# Patient Record
Sex: FEMALE | Race: WHITE | NOT HISPANIC OR LATINO | URBAN - METROPOLITAN AREA
[De-identification: names, ages, dates, MRNs, and addresses within clinical notes are randomized per-mention and may not be internally consistent; named-entity substitution may affect disease eponyms.]

---

## 2017-02-15 ENCOUNTER — OUTPATIENT (OUTPATIENT)
Dept: OUTPATIENT SERVICES | Facility: HOSPITAL | Age: 67
LOS: 1 days | Discharge: HOME | End: 2017-02-15

## 2017-06-27 DIAGNOSIS — I10 ESSENTIAL (PRIMARY) HYPERTENSION: ICD-10-CM

## 2017-06-27 DIAGNOSIS — E78.5 HYPERLIPIDEMIA, UNSPECIFIED: ICD-10-CM

## 2017-06-27 DIAGNOSIS — D64.9 ANEMIA, UNSPECIFIED: ICD-10-CM

## 2017-06-27 DIAGNOSIS — E55.9 VITAMIN D DEFICIENCY, UNSPECIFIED: ICD-10-CM

## 2017-06-27 DIAGNOSIS — E03.9 HYPOTHYROIDISM, UNSPECIFIED: ICD-10-CM

## 2018-11-14 ENCOUNTER — OUTPATIENT (OUTPATIENT)
Dept: OUTPATIENT SERVICES | Facility: HOSPITAL | Age: 68
LOS: 1 days | Discharge: HOME | End: 2018-11-14

## 2018-11-14 DIAGNOSIS — E78.5 HYPERLIPIDEMIA, UNSPECIFIED: ICD-10-CM

## 2018-11-14 DIAGNOSIS — D64.9 ANEMIA, UNSPECIFIED: ICD-10-CM

## 2018-11-14 DIAGNOSIS — E03.9 HYPOTHYROIDISM, UNSPECIFIED: ICD-10-CM

## 2018-11-14 DIAGNOSIS — E55.9 VITAMIN D DEFICIENCY, UNSPECIFIED: ICD-10-CM

## 2018-11-14 DIAGNOSIS — R73.01 IMPAIRED FASTING GLUCOSE: ICD-10-CM

## 2018-11-14 DIAGNOSIS — I10 ESSENTIAL (PRIMARY) HYPERTENSION: ICD-10-CM

## 2025-06-03 ENCOUNTER — INPATIENT (INPATIENT)
Facility: HOSPITAL | Age: 75
LOS: 1 days | Discharge: HOME | DRG: 69 | End: 2025-06-05
Attending: INTERNAL MEDICINE | Admitting: INTERNAL MEDICINE
Payer: MEDICARE

## 2025-06-03 VITALS
DIASTOLIC BLOOD PRESSURE: 85 MMHG | OXYGEN SATURATION: 97 % | TEMPERATURE: 98 F | SYSTOLIC BLOOD PRESSURE: 143 MMHG | RESPIRATION RATE: 18 BRPM | HEART RATE: 97 BPM | WEIGHT: 114.64 LBS

## 2025-06-03 PROCEDURE — 71045 X-RAY EXAM CHEST 1 VIEW: CPT | Mod: 26

## 2025-06-03 PROCEDURE — 93010 ELECTROCARDIOGRAM REPORT: CPT

## 2025-06-03 PROCEDURE — 99291 CRITICAL CARE FIRST HOUR: CPT | Mod: FS

## 2025-06-03 NOTE — ED ADULT TRIAGE NOTE - CHIEF COMPLAINT QUOTE
from home pt states she is having difficulty driving since yesterday with some vision changes over the last few months.

## 2025-06-04 DIAGNOSIS — I63.9 CEREBRAL INFARCTION, UNSPECIFIED: ICD-10-CM

## 2025-06-04 LAB
ALBUMIN SERPL ELPH-MCNC: 4.4 G/DL — SIGNIFICANT CHANGE UP (ref 3.5–5.2)
ALP SERPL-CCNC: 56 U/L — SIGNIFICANT CHANGE UP (ref 30–115)
ALT FLD-CCNC: 18 U/L — SIGNIFICANT CHANGE UP (ref 0–41)
ANION GAP SERPL CALC-SCNC: 12 MMOL/L — SIGNIFICANT CHANGE UP (ref 7–14)
ANION GAP SERPL CALC-SCNC: 15 MMOL/L — HIGH (ref 7–14)
APPEARANCE UR: CLEAR — SIGNIFICANT CHANGE UP
APTT BLD: 27.6 SEC — SIGNIFICANT CHANGE UP (ref 27–39.2)
AST SERPL-CCNC: 24 U/L — SIGNIFICANT CHANGE UP (ref 0–41)
BACTERIA # UR AUTO: ABNORMAL /HPF
BASOPHILS # BLD AUTO: 0.05 K/UL — SIGNIFICANT CHANGE UP (ref 0–0.2)
BASOPHILS NFR BLD AUTO: 0.8 % — SIGNIFICANT CHANGE UP (ref 0–1)
BILIRUB SERPL-MCNC: 0.7 MG/DL — SIGNIFICANT CHANGE UP (ref 0.2–1.2)
BILIRUB UR-MCNC: NEGATIVE — SIGNIFICANT CHANGE UP
BUN SERPL-MCNC: 19 MG/DL — SIGNIFICANT CHANGE UP (ref 10–20)
BUN SERPL-MCNC: 23 MG/DL — HIGH (ref 10–20)
CALCIUM SERPL-MCNC: 10.2 MG/DL — SIGNIFICANT CHANGE UP (ref 8.4–10.5)
CALCIUM SERPL-MCNC: 8.7 MG/DL — SIGNIFICANT CHANGE UP (ref 8.4–10.5)
CHLORIDE SERPL-SCNC: 101 MMOL/L — SIGNIFICANT CHANGE UP (ref 98–110)
CHLORIDE SERPL-SCNC: 91 MMOL/L — LOW (ref 98–110)
CHOLEST SERPL-MCNC: 232 MG/DL — HIGH
CO2 SERPL-SCNC: 23 MMOL/L — SIGNIFICANT CHANGE UP (ref 17–32)
CO2 SERPL-SCNC: 24 MMOL/L — SIGNIFICANT CHANGE UP (ref 17–32)
COLOR SPEC: YELLOW — SIGNIFICANT CHANGE UP
CREAT SERPL-MCNC: 0.5 MG/DL — LOW (ref 0.7–1.5)
CREAT SERPL-MCNC: 0.5 MG/DL — LOW (ref 0.7–1.5)
DIFF PNL FLD: NEGATIVE — SIGNIFICANT CHANGE UP
EGFR: 99 ML/MIN/1.73M2 — SIGNIFICANT CHANGE UP
EOSINOPHIL # BLD AUTO: 0.18 K/UL — SIGNIFICANT CHANGE UP (ref 0–0.7)
EOSINOPHIL NFR BLD AUTO: 3 % — SIGNIFICANT CHANGE UP (ref 0–8)
EPI CELLS # UR: PRESENT
ETHANOL SERPL-MCNC: <10 MG/DL — SIGNIFICANT CHANGE UP
ETHANOL SERPL-MCNC: <10 MG/DL — SIGNIFICANT CHANGE UP
GLUCOSE BLDC GLUCOMTR-MCNC: 114 MG/DL — HIGH (ref 70–99)
GLUCOSE SERPL-MCNC: 112 MG/DL — HIGH (ref 70–99)
GLUCOSE SERPL-MCNC: 85 MG/DL — SIGNIFICANT CHANGE UP (ref 70–99)
GLUCOSE UR QL: NEGATIVE MG/DL — SIGNIFICANT CHANGE UP
HCT VFR BLD CALC: 41.8 % — SIGNIFICANT CHANGE UP (ref 37–47)
HDLC SERPL-MCNC: 81 MG/DL — SIGNIFICANT CHANGE UP
HGB BLD-MCNC: 14.7 G/DL — SIGNIFICANT CHANGE UP (ref 12–16)
IMM GRANULOCYTES NFR BLD AUTO: 0.2 % — SIGNIFICANT CHANGE UP (ref 0.1–0.3)
INR BLD: 1.01 RATIO — SIGNIFICANT CHANGE UP (ref 0.65–1.3)
KETONES UR QL: NEGATIVE MG/DL — SIGNIFICANT CHANGE UP
LDLC SERPL-MCNC: 137 MG/DL — HIGH
LEUKOCYTE ESTERASE UR-ACNC: ABNORMAL
LIPID PNL WITH DIRECT LDL SERPL: 137 MG/DL — HIGH
LYMPHOCYTES # BLD AUTO: 2.32 K/UL — SIGNIFICANT CHANGE UP (ref 1.2–3.4)
LYMPHOCYTES # BLD AUTO: 38.5 % — SIGNIFICANT CHANGE UP (ref 20.5–51.1)
MAGNESIUM SERPL-MCNC: 1.9 MG/DL — SIGNIFICANT CHANGE UP (ref 1.8–2.4)
MCHC RBC-ENTMCNC: 31.5 PG — HIGH (ref 27–31)
MCHC RBC-ENTMCNC: 35.2 G/DL — SIGNIFICANT CHANGE UP (ref 32–37)
MCV RBC AUTO: 89.7 FL — SIGNIFICANT CHANGE UP (ref 81–99)
MONOCYTES # BLD AUTO: 0.48 K/UL — SIGNIFICANT CHANGE UP (ref 0.1–0.6)
MONOCYTES NFR BLD AUTO: 8 % — SIGNIFICANT CHANGE UP (ref 1.7–9.3)
MRSA PCR RESULT.: NEGATIVE — SIGNIFICANT CHANGE UP
NEUTROPHILS # BLD AUTO: 2.99 K/UL — SIGNIFICANT CHANGE UP (ref 1.4–6.5)
NEUTROPHILS NFR BLD AUTO: 49.5 % — SIGNIFICANT CHANGE UP (ref 42.2–75.2)
NITRITE UR-MCNC: NEGATIVE — SIGNIFICANT CHANGE UP
NONHDLC SERPL-MCNC: 151 MG/DL — HIGH
NRBC BLD AUTO-RTO: 0 /100 WBCS — SIGNIFICANT CHANGE UP (ref 0–0)
OSMOLALITY SERPL: 277 MOS/KG — LOW (ref 280–301)
PH UR: 7 — SIGNIFICANT CHANGE UP (ref 5–8)
PLATELET # BLD AUTO: 277 K/UL — SIGNIFICANT CHANGE UP (ref 130–400)
PMV BLD: SIGNIFICANT CHANGE UP (ref 7.4–10.4)
POTASSIUM SERPL-MCNC: 3.8 MMOL/L — SIGNIFICANT CHANGE UP (ref 3.5–5)
POTASSIUM SERPL-MCNC: 4.7 MMOL/L — SIGNIFICANT CHANGE UP (ref 3.5–5)
POTASSIUM SERPL-SCNC: 3.8 MMOL/L — SIGNIFICANT CHANGE UP (ref 3.5–5)
POTASSIUM SERPL-SCNC: 4.7 MMOL/L — SIGNIFICANT CHANGE UP (ref 3.5–5)
PROT SERPL-MCNC: 6.6 G/DL — SIGNIFICANT CHANGE UP (ref 6–8)
PROT UR-MCNC: NEGATIVE MG/DL — SIGNIFICANT CHANGE UP
PROTHROM AB SERPL-ACNC: 11.9 SEC — SIGNIFICANT CHANGE UP (ref 9.95–12.87)
RBC # BLD: 4.66 M/UL — SIGNIFICANT CHANGE UP (ref 4.2–5.4)
RBC # FLD: 12.9 % — SIGNIFICANT CHANGE UP (ref 11.5–14.5)
RBC CASTS # UR COMP ASSIST: 2 /HPF — SIGNIFICANT CHANGE UP (ref 0–4)
SODIUM SERPL-SCNC: 130 MMOL/L — LOW (ref 135–146)
SODIUM SERPL-SCNC: 136 MMOL/L — SIGNIFICANT CHANGE UP (ref 135–146)
SODIUM UR-SCNC: 28 MMOL/L — SIGNIFICANT CHANGE UP
SP GR SPEC: 1.02 — SIGNIFICANT CHANGE UP (ref 1–1.03)
TRIGL SERPL-MCNC: 82 MG/DL — SIGNIFICANT CHANGE UP
TROPONIN T, HIGH SENSITIVITY RESULT: 24 NG/L — HIGH (ref 6–13)
TROPONIN T, HIGH SENSITIVITY RESULT: 25 NG/L — HIGH (ref 6–13)
UROBILINOGEN FLD QL: 0.2 MG/DL — SIGNIFICANT CHANGE UP (ref 0.2–1)
WBC # BLD: 6.03 K/UL — SIGNIFICANT CHANGE UP (ref 4.8–10.8)
WBC # FLD AUTO: 6.03 K/UL — SIGNIFICANT CHANGE UP (ref 4.8–10.8)
WBC UR QL: 5 /HPF — SIGNIFICANT CHANGE UP (ref 0–5)

## 2025-06-04 PROCEDURE — 83935 ASSAY OF URINE OSMOLALITY: CPT

## 2025-06-04 PROCEDURE — 83036 HEMOGLOBIN GLYCOSYLATED A1C: CPT

## 2025-06-04 PROCEDURE — 94640 AIRWAY INHALATION TREATMENT: CPT

## 2025-06-04 PROCEDURE — 36415 COLL VENOUS BLD VENIPUNCTURE: CPT

## 2025-06-04 PROCEDURE — 85027 COMPLETE CBC AUTOMATED: CPT

## 2025-06-04 PROCEDURE — 84300 ASSAY OF URINE SODIUM: CPT

## 2025-06-04 PROCEDURE — 70450 CT HEAD/BRAIN W/O DYE: CPT | Mod: 26,XU

## 2025-06-04 PROCEDURE — 80307 DRUG TEST PRSMV CHEM ANLYZR: CPT

## 2025-06-04 PROCEDURE — 82962 GLUCOSE BLOOD TEST: CPT

## 2025-06-04 PROCEDURE — 87640 STAPH A DNA AMP PROBE: CPT

## 2025-06-04 PROCEDURE — 70496 CT ANGIOGRAPHY HEAD: CPT | Mod: 26

## 2025-06-04 PROCEDURE — 70551 MRI BRAIN STEM W/O DYE: CPT | Mod: 26

## 2025-06-04 PROCEDURE — 97162 PT EVAL MOD COMPLEX 30 MIN: CPT | Mod: GP

## 2025-06-04 PROCEDURE — 99222 1ST HOSP IP/OBS MODERATE 55: CPT | Mod: FS

## 2025-06-04 PROCEDURE — 93306 TTE W/DOPPLER COMPLETE: CPT | Mod: 26

## 2025-06-04 PROCEDURE — 70551 MRI BRAIN STEM W/O DYE: CPT

## 2025-06-04 PROCEDURE — 97165 OT EVAL LOW COMPLEX 30 MIN: CPT | Mod: GO

## 2025-06-04 PROCEDURE — 80061 LIPID PANEL: CPT

## 2025-06-04 PROCEDURE — 87641 MR-STAPH DNA AMP PROBE: CPT

## 2025-06-04 PROCEDURE — 99222 1ST HOSP IP/OBS MODERATE 55: CPT

## 2025-06-04 PROCEDURE — 92610 EVALUATE SWALLOWING FUNCTION: CPT | Mod: GN

## 2025-06-04 PROCEDURE — 70498 CT ANGIOGRAPHY NECK: CPT | Mod: 26

## 2025-06-04 PROCEDURE — 93306 TTE W/DOPPLER COMPLETE: CPT

## 2025-06-04 PROCEDURE — ZZZZZ: CPT

## 2025-06-04 PROCEDURE — 93010 ELECTROCARDIOGRAM REPORT: CPT

## 2025-06-04 PROCEDURE — 80048 BASIC METABOLIC PNL TOTAL CA: CPT

## 2025-06-04 RX ORDER — LATANOPROST PF 0.05 MG/ML
1 SOLUTION/ DROPS OPHTHALMIC AT BEDTIME
Refills: 0 | Status: DISCONTINUED | OUTPATIENT
Start: 2025-06-04 | End: 2025-06-05

## 2025-06-04 RX ORDER — AMLODIPINE BESYLATE 10 MG/1
5 TABLET ORAL
Refills: 0 | DISCHARGE

## 2025-06-04 RX ORDER — ATORVASTATIN CALCIUM 80 MG/1
80 TABLET, FILM COATED ORAL AT BEDTIME
Refills: 0 | Status: DISCONTINUED | OUTPATIENT
Start: 2025-06-04 | End: 2025-06-05

## 2025-06-04 RX ORDER — ASPIRIN 325 MG
324 TABLET ORAL ONCE
Refills: 0 | Status: COMPLETED | OUTPATIENT
Start: 2025-06-04 | End: 2025-06-04

## 2025-06-04 RX ORDER — ENOXAPARIN SODIUM 100 MG/ML
40 INJECTION SUBCUTANEOUS EVERY 24 HOURS
Refills: 0 | Status: DISCONTINUED | OUTPATIENT
Start: 2025-06-04 | End: 2025-06-05

## 2025-06-04 RX ORDER — AMLODIPINE BESYLATE 10 MG/1
5 TABLET ORAL DAILY
Refills: 0 | Status: DISCONTINUED | OUTPATIENT
Start: 2025-06-04 | End: 2025-06-04

## 2025-06-04 RX ORDER — MELATONIN 5 MG
5 TABLET ORAL AT BEDTIME
Refills: 0 | Status: DISCONTINUED | OUTPATIENT
Start: 2025-06-04 | End: 2025-06-05

## 2025-06-04 RX ORDER — BRIMONIDINE TARTRATE 1.5 MG/ML
1 SOLUTION/ DROPS OPHTHALMIC
Refills: 0 | Status: DISCONTINUED | OUTPATIENT
Start: 2025-06-04 | End: 2025-06-05

## 2025-06-04 RX ORDER — BRIMONIDINE TARTRATE 1.5 MG/ML
1 SOLUTION/ DROPS OPHTHALMIC
Refills: 0 | DISCHARGE

## 2025-06-04 RX ORDER — ASPIRIN 325 MG
81 TABLET ORAL DAILY
Refills: 0 | Status: DISCONTINUED | OUTPATIENT
Start: 2025-06-04 | End: 2025-06-05

## 2025-06-04 RX ORDER — LATANOPROST PF 0.05 MG/ML
1 SOLUTION/ DROPS OPHTHALMIC
Refills: 0 | DISCHARGE

## 2025-06-04 RX ORDER — ACETAMINOPHEN 500 MG/5ML
650 LIQUID (ML) ORAL EVERY 6 HOURS
Refills: 0 | Status: DISCONTINUED | OUTPATIENT
Start: 2025-06-04 | End: 2025-06-05

## 2025-06-04 RX ADMIN — ATORVASTATIN CALCIUM 80 MILLIGRAM(S): 80 TABLET, FILM COATED ORAL at 21:57

## 2025-06-04 RX ADMIN — Medication 81 MILLIGRAM(S): at 11:35

## 2025-06-04 RX ADMIN — ENOXAPARIN SODIUM 40 MILLIGRAM(S): 100 INJECTION SUBCUTANEOUS at 06:50

## 2025-06-04 RX ADMIN — LATANOPROST PF 1 DROP(S): 0.05 SOLUTION/ DROPS OPHTHALMIC at 21:49

## 2025-06-04 RX ADMIN — Medication 324 MILLIGRAM(S): at 03:57

## 2025-06-04 RX ADMIN — Medication 5 MILLIGRAM(S): at 21:52

## 2025-06-04 RX ADMIN — Medication 75 MILLILITER(S): at 06:50

## 2025-06-04 RX ADMIN — Medication 650 MILLIGRAM(S): at 23:00

## 2025-06-04 RX ADMIN — Medication 40 MILLIGRAM(S): at 06:50

## 2025-06-04 RX ADMIN — Medication 1 APPLICATION(S): at 11:35

## 2025-06-04 RX ADMIN — BRIMONIDINE TARTRATE 1 DROP(S): 1.5 SOLUTION/ DROPS OPHTHALMIC at 17:58

## 2025-06-04 RX ADMIN — Medication 1000 MILLILITER(S): at 01:11

## 2025-06-04 RX ADMIN — Medication 650 MILLIGRAM(S): at 22:10

## 2025-06-04 RX ADMIN — Medication 75 MILLILITER(S): at 19:55

## 2025-06-04 NOTE — PHYSICAL THERAPY INITIAL EVALUATION ADULT - ADDITIONAL COMMENTS
As per pt She lives W/ Son in PH W/  5 Steps to enter W/ BL HR and 8 Steps to  her level  W/  LT HR , as per pt she was independent W. all functional activity PTA and was driving

## 2025-06-04 NOTE — PHYSICAL THERAPY INITIAL EVALUATION ADULT - ADL SKILLS, REHAB EVAL
Chief Complaint   Patient presents with    Medicare AWV     Pt here today for AWV    Have you seen any other physician or provider since your last visit no    Have you had any other diagnostic tests since your last visit? no    Have you changed or stopped any medications since your last visit? no independent

## 2025-06-04 NOTE — ED PROVIDER NOTE - PHYSICAL EXAMINATION
CONSTITUTIONAL: Well-appearing; in no apparent distress.   EYES: PERRL; EOM intact.   CARDIOVASCULAR: Normal S1, S2; no murmurs, rubs, or gallops.   RESPIRATORY: Normal chest excursion with respiration; breath sounds clear and equal bilaterally; no wheezes, rhonchi, or rales.  GI/: Normal bowel sounds; non-distended; non-tender; no palpable organomegaly.   MS: No deformity to extremity.  SKIN: No acute rash   NEURO/PSYCH: A & O x 4; CN I-XII grossly intact. no drifting. sensation equal to b/l face and upper/lower extremities. + Word finding difficulty. ?  Slurred.  Normal finger-to-nose.  Ambulates steady.

## 2025-06-04 NOTE — OCCUPATIONAL THERAPY INITIAL EVALUATION ADULT - LIVES WITH, PROFILE
in private house son lives in downstairs apartment; 0 steps to enter via the garage then 5 steps + 8 steps with BHR to main living area; (+) Bathtub (+) Standard toilet/children

## 2025-06-04 NOTE — SWALLOW BEDSIDE ASSESSMENT ADULT - SLP GENERAL OBSERVATIONS
Pt. received in bed awake and alert. Oriented to person, place, and time. Reported she came to the hospital 2/2 to "eye problems". Suspected cognitive deficits present.

## 2025-06-04 NOTE — H&P ADULT - NS ATTEND AMEND GEN_ALL_CORE FT
elderly W female c/o feeling off. pt appears confused . adm to r/o acute stroke vs dementia alzheimer's type   PMH;  HTN, glaucoma left eye  ICU stepdown  neurology consult  MRI brain  PT/OT  [id number]  lipitor 80mg  pt's appears confuse, Na-130 w/u for hyponatremia

## 2025-06-04 NOTE — H&P ADULT - ASSESSMENT
elderly W female c/o feeling off. pt appears confused . adm to r/o acute stroke vs dementia alzheimer's type   PMH;  HTN, glaucoma left eye elderly W female c/o feeling off. pt appears confused . adm to r/o acute stroke vs dementia alzheimer's type   PMH;  HTN, glaucoma left eye  ICU stepdown  neurology consult  MRI brain  PT/OT  [id number]  lipitor 80mg  pt's appears confuse, Na-130 w/u for hyponatremia

## 2025-06-04 NOTE — H&P ADULT - PROBLEM SELECTOR PLAN 1
ICU stepdown  neurology consult  MRI brain  PT/OT  [id number]  lipitor 80mg  pt's appears confuse, Na-130 w/u for hyponatremia

## 2025-06-04 NOTE — ED PROVIDER NOTE - CLINICAL SUMMARY MEDICAL DECISION MAKING FREE TEXT BOX
74 years old female history of hypertension, left eye glaucoma (blind for 2 to 3 months)  pw  stroke symptoms  started 24-48 hours ago . son noticed intermittent slurred speech since yesterday -   Pt  asked ot  come to hospital as she was concerned she having a stroke has  been feeling "off", dizzy. no fall or trauma  no dysphagia , . in ED  Alert and oriented.  CN 2-12 intact.  Motor strength and sensory response is symmetric.  CB intact. normal gait,  occasional word findings difficulty - when asked son and patient at bedside if this is new, patient  says no,  son is not sure.  no stroke code called due to  unknown  onset. 74 years old female history of hypertension, left eye glaucoma (blind for 2 to 3 months)  pw  stroke symptoms  started 24-48 hours ago . son noticed intermittent slurred speech since yesterday -   Pt  asked ot  come to hospital as she was concerned she having a stroke has  been feeling "off", dizzy. no fall or trauma  no dysphagia , . in ED  Alert and oriented.  CN 2-12 intact.  Motor strength and sensory response is symmetric.  CB intact. normal gait,  occasional word findings difficulty - when asked son and patient at bedside if this is new, patient  says no,  son is not sure.  no stroke code called due to  unknown  onset not tpa candidate,   CT CTA  no acute pathology    dw neuro - plan for admission for q4 hour neuro checks   asa givenin ED

## 2025-06-04 NOTE — PHYSICAL THERAPY INITIAL EVALUATION ADULT - GENERAL OBSERVATIONS, REHAB EVAL
9:05 - 9:30 Chart reviewed. Order received.  Patient is ok to be  seen for PT,confirmed with RN. pt encountered  Semi costa in bed   W/ OT Zakiya denies pain, and agrees to participate in session, +    RUE IV / Tele / Pulse ox /  BP Cuff ,NAD.

## 2025-06-04 NOTE — ED PROVIDER NOTE - OBJECTIVE STATEMENT
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  Third time of onset of symptoms unclear.  Patient reports she started feeling off, dizzy, trouble speaking, not able to drive since yesterday.  Continue with above symptoms and feeling she is having a stroke so she called her son who brought patient to ED for evaluation.  Patient otherwise denies headache, extremity numbness and weakness, facial droop, chest pain, abdominal pain, nausea, vomiting, diarrhea or urinary symptoms.

## 2025-06-04 NOTE — ED PROVIDER NOTE - PROGRESS NOTE DETAILS
discussed with neuro -  recommends q4h  neurochecks Pt explains she does not want to follow with Dr nichols any more,  Kael admit now to hospitalist

## 2025-06-04 NOTE — OCCUPATIONAL THERAPY INITIAL EVALUATION ADULT - GENERAL OBSERVATIONS, REHAB EVAL
09:00-09:25 Chart reviewed, ok to treat by Occupational Therapist as confirmed by RN Marcia, Pt received semi-Sung's in bed (+) IV (disconnected by RN) (+) tele (+) BP Cuff (+) Pulse O2 in NAD. Pt in agreement with OT IE.

## 2025-06-04 NOTE — CONSULT NOTE ADULT - ASSESSMENT
IMPRESSION:    - AMS   - Hyponatremia-mild   - H/O HTN   - H/O HLD        PLAN:      CNS: avoid sedation, CT head noted, f/u MRI brain, neuro reccs     HEENT: Oral care    PULMONARY:  HOB @ 45 degrees.  Aspiration precautions, wean oxygen, CXR, RVP     CARDIOVASCULAR: avoid volume overload, MAP adequate, troponin noted, f/u echo      GI: GI prophylaxis.  Feeding.      RENAL:  Follow up lytes.  Correct as needed, trend Na, f/u U Osm and Anni, serum Osm     INFECTIOUS DISEASE: Follow up cultures, procalcitonin, RVP     HEMATOLOGICAL:  DVT prophylaxis. DIMER    ENDOCRINE:  Follow up FS.  Insulin protocol if needed. f.u TSH     MUSCULOSKELETAL: ambulate as tolerated    Can DG to tele if neuro okay with less frequent neuro-checks

## 2025-06-04 NOTE — PATIENT PROFILE ADULT - ARRIVAL FROM
12.4   20.77 )-----------( 461      ( 2017 15:45 )             39.9           152<H>  |  107  |  36<H>  ----------------------------<  131<H>  3.0<L>   |  33<H>  |  0.75    Ca    9.4      2017 15:45  Mg     2.1         TPro  7.5  /  Alb  3.2<L>  /  TBili  0.4  /  DBili  x   /  AST  89<H>  /  ALT  61<H>  /  AlkPhos  169<H>          Urinalysis Basic - ( 2017 15:45 )    Color: YELLOW / Appearance: HAZY / S.016 / pH: 6.5  Gluc: NEGATIVE / Ketone: NEGATIVE  / Bili: NEGATIVE / Urobili: NORMAL E.U.   Blood: SMALL / Protein: 30 / Nitrite: NEGATIVE   Leuk Esterase: LARGE / RBC: 10-25 / WBC >50   Sq Epi: OCC / Non Sq Epi: x / Bacteria: MOD    CARDIAC MARKERS ( 2017 15:45 )  x     / < 0.06 ng/mL / 173 u/L / 2.09 ng/mL / x        T(C): 36.8 (17 @ 16:18), Max: 39.7 (17 @ 16:12)  HR: 93 (17 @ 16:18) (93 - 99)  BP: 91/46 (17 @ 16:18) (91/46 - 97/59)  RR: 22 (17 @ 16:18) (20 - 22)  SpO2: 97% (17 @ 16:18) (79% - 97%) Home Universal Safety Interventions

## 2025-06-04 NOTE — OCCUPATIONAL THERAPY INITIAL EVALUATION ADULT - PERTINENT HX OF CURRENT PROBLEM, REHAB EVAL
76yo W female , PMH as noted below. Pt w/ various complaints : stated to ER MD she feels: she's having a stroke. saying she can't drive, and she not eating. Also pt changes her story initially c/o dizziness , but when I ask her about feeling dizzy she denies.  The  Pt's son is not much of help , he is unaware that mother having confused state  Pt denies-tobacco, alcohol, drug abuse, cardiac arrhthymias , Supposedly this was happening over the last 2 days,  No stroke code was called in ER and CT imaging was all negative for acute pathology, The labs however did show some mild hyponatremia ,130. will send w/u for hyponatremia , which may explain pt confused state vs early dementia.    ER MD contacted neurology Dr Fried who recommended  neuro checks q 4h. and stroke w/u. Pt for adm to ICU stepdown for r/o stroke

## 2025-06-04 NOTE — PATIENT PROFILE ADULT - FALL HARM RISK - RISK INTERVENTIONS
Assistance OOB with selected safe patient handling equipment/Assistance with ambulation/Communicate Fall Risk and Risk Factors to all staff, patient, and family/Discuss with provider need for PT consult/Monitor gait and stability/Provide patient with walking aids - walker, cane, crutches/Reinforce activity limits and safety measures with patient and family/Sit up slowly, dangle for a short time, stand at bedside before walking/Visual Cue: Yellow wristband/Bed in lowest position, wheels locked, appropriate side rails in place/Call bell, personal items and telephone in reach/Instruct patient to call for assistance before getting out of bed or chair/Non-slip footwear when patient is out of bed/[place] to call system/Physically safe environment - no spills, clutter or unnecessary equipment/Purposeful Proactive Rounding/Room/bathroom lighting operational, light cord in reach

## 2025-06-04 NOTE — CONSULT NOTE ADULT - SUBJECTIVE AND OBJECTIVE BOX
Patient is a 74y old  Female who presents with a chief complaint of r/o stroke (2025 07:36)      HPI:  74yo W female , PMH as noted below. Pt w/ various complaints : stated to ER MD she feels: she's having a stroke. saying she can't drive, and she not eating. Also pt changes her story initially c/o dizziness , but when I ask her about feeling dizzy she denies.  The  Pt's son is not much of help , he is unaware that mother having confused state  Pt denies-tobacco, alcohol, drug abuse, cardiac arrhthymias , Supposedly this was happening over the last 2 days,  No stroke code was called in ER and CT imaging was all negative for acute pathology, The labs however did show some mild hyponatremia ,130. will send w/u for hyponatremia , which may explain pt confused state vs early dementia.    ER MD contacted neurology Dr Fried who recommended  neuro checks q 4h. and stroke w/u. Pt for adm to ICU stepdown for r/o stroke (2025 05:22)      PAST MEDICAL & SURGICAL HISTORY:  HTN (hypertension)      Glaucoma      FAMILY HISTORY:  :  No known cardiovacular family hisotry     Review Of Systems:     All ROS are negative except per HPI       Allergies    No Known Allergies    Intolerances          PHYSICAL EXAM    ICU Vital Signs Last 24 Hrs  T(C): 36 (2025 05:45), Max: 36.8 (2025 23:38)  T(F): 96.8 (2025 05:45), Max: 98.2 (2025 23:38)  HR: 91 (2025 05:45) (82 - 97)  BP: 138/67 (2025 05:45) (138/67 - 159/79)  BP(mean): 97 (2025 05:45) (97 - 97)  ABP: --  ABP(mean): --  RR: 24 (2025 05:45) (18 - 24)  SpO2: 93% (2025 05:45) (93% - 97%)    O2 Parameters below as of 2025 05:45  Patient On (Oxygen Delivery Method): room air            CONSTITUTIONAL:  Well nourished.   NAD    ENT:   Airway patent,   Mouth with normal mucosa.       CARDIAC:   Normal rate,   Regular rhythm.    No edema      RESPIRATORY:   No wheezing  Bilateral BS   Not tachypneic,  No use of accessory muscles    GASTROINTESTINAL:  Abdomen soft,   Non-tender,   No guarding,   + BS    NEUROLOGICAL:   Alert and oriented   No motor deficits.    SKIN:   Skin normal color for race,   No evidence of rash.        25 @ 07:01  -  25 @ 07:00  --------------------------------------------------------  IN:    sodium chloride 0.9%: 75 mL  Total IN: 75 mL    OUT:  Total OUT: 0 mL    Total NET: 75 mL          LABS:                          14.7   6.03  )-----------( 277      ( 2025 00:02 )             41.8                                               06-    130[L]  |  91[L]  |  19  ----------------------------<  112[H]  3.8   |  24  |  0.5[L]    Ca    10.2      2025 00:02  Mg     1.9     06-    TPro  6.6  /  Alb  4.4  /  TBili  0.7  /  DBili  x   /  AST  24  /  ALT  18  /  AlkPhos  56  06-04      PT/INR - ( 2025 00:02 )   PT: 11.90 sec;   INR: 1.01 ratio         PTT - ( 2025 00:02 )  PTT:27.6 sec                                       Urinalysis Basic - ( 2025 02:48 )    Color: Yellow / Appearance: Clear / S.025 / pH: x  Gluc: x / Ketone: x  / Bili: Negative / Urobili: 0.2 mg/dL   Blood: x / Protein: Negative mg/dL / Nitrite: Negative   Leuk Esterase: Small / RBC: 2 /HPF / WBC 5 /HPF   Sq Epi: x / Non Sq Epi: x / Bacteria: Few /HPF                                                  LIVER FUNCTIONS - ( 2025 00:02 )  Alb: 4.4 g/dL / Pro: 6.6 g/dL / ALK PHOS: 56 U/L / ALT: 18 U/L / AST: 24 U/L / GGT: x                                                  Urinalysis with Rflx Culture (collected 2025 02:48)                                                                                           X-Rays reviewed                                                                                     ECHO      MEDICATIONS  (STANDING):  amLODIPine   Tablet 5 milliGRAM(s) Oral daily  aspirin enteric coated 81 milliGRAM(s) Oral daily  atorvastatin 80 milliGRAM(s) Oral at bedtime  brimonidine 0.2% Ophthalmic Solution 1 Drop(s) Both EYES two times a day  chlorhexidine 2% Cloths 1 Application(s) Topical <User Schedule>  enoxaparin Injectable 40 milliGRAM(s) SubCutaneous every 24 hours  latanoprost 0.005% Ophthalmic Solution 1 Drop(s) Both EYES at bedtime  pantoprazole    Tablet 40 milliGRAM(s) Oral before breakfast  sodium chloride 0.9%. 1000 milliLiter(s) (75 mL/Hr) IV Continuous <Continuous>    MEDICATIONS  (PRN):

## 2025-06-04 NOTE — PATIENT PROFILE ADULT - FALL HARM RISK - FACTORS NURSING JUDGEMENT
Mother to schedule visit for infant at Dr Otoole's office in 2-3 days.     No [facility]

## 2025-06-04 NOTE — PHYSICAL THERAPY INITIAL EVALUATION ADULT - PERTINENT HX OF CURRENT PROBLEM, REHAB EVAL
74yo W female , PMH as noted below. Pt w/ various complaints : stated to ER MD she feels: she's having a stroke. saying she can't drive, and she not eating. Also pt changes her story initially c/o dizziness , but when I ask her about feeling dizzy she denies.  The  Pt's son is not much of help , he is unaware that mother having confused state  Pt denies-tobacco, alcohol, drug abuse, cardiac arrhthymias , Supposedly this was happening over the last 2 days,  No stroke code was called in ER and CT imaging was all negative for acute pathology, The labs however did show some mild hyponatremia ,130. will send w/u for hyponatremia , which may explain pt confused state vs early dementia.    ER MD contacted neurology Dr Fried who recommended  neuro checks q 4h. and stroke w/u. Pt for adm to ICU stepdown for r/o stroke  fall precautions

## 2025-06-04 NOTE — ED ADULT NURSE NOTE - NSFALLUNIVINTERV_ED_ALL_ED
Bed/Stretcher in lowest position, wheels locked, appropriate side rails in place/Call bell, personal items and telephone in reach/Instruct patient to call for assistance before getting out of bed/chair/stretcher/Non-slip footwear applied when patient is off stretcher/[place] to call system/Physically safe environment - no spills, clutter or unnecessary equipment/Purposeful proactive rounding/Room/bathroom lighting operational, light cord in reach

## 2025-06-04 NOTE — H&P ADULT - HISTORY OF PRESENT ILLNESS
74yo W female , PMH as noted below. Pt w/ various complaints : stated to ER MD she feels: she's having a stroke. saying she can't drive, and she not eating. Also pt changes her story initially c/o dizziness , but when I ask her about feeling dizzy she denies.  The  Pt's son is not much of help , he is unaware that mother having confused state  Pt denies-tobacco, alcohol, drug abuse, cardiac arrhthymias , Supposedly this was happening over the last 2 days,  No stroke code was called in ER and CT imaging was all negative for acute pathology, The labs however did show some mild hyponatremia ,130. will send w/u for hyponatremia , which may explain pt confused state vs early dementia.    ER MD contacted neurology Dr Fried who recommended  neuro checks q 4h. and stroke w/u. Pt for adm to ICU stepdown for r/o stroke

## 2025-06-04 NOTE — CONSULT NOTE ADULT - SUBJECTIVE AND OBJECTIVE BOX
HPI:  74 yo W female , PMH as noted below. Pt w/ various complaints : stated to ER MD she feels: she's having a stroke. saying she can't drive, and she not eating. Also pt changes her story initially c/o dizziness , but when I ask her about feeling dizzy she denies.  The  Pt's son is not much of help , he is unaware that mother having confused state  Pt denies-tobacco, alcohol, drug abuse, cardiac arrhthymias , Supposedly this was happening over the last 2 days,  No stroke code was called in ER and CT imaging was all negative for acute pathology, The labs however did show some mild hyponatremia ,130. will send w/u for hyponatremia , which may explain pt confused state vs early dementia.    ER MD contacted neurology Dr Fried who recommended  neuro checks q 4h. and stroke w/u. Pt for adm to ICU stepdown for r/o stroke , ptn seen and exam  at  bed  side  no new c/o  aox3  doing  well  ptn  labs  , imaging and  medical  notes are  appreciated  and  reviewed  .    PTN  REFERRED TO ACUTE  REHAB  FOR  EVAL AND  TX   PAST MEDICAL & SURGICAL HISTORY:  HTN (hypertension)      Glaucoma          Hospital Course:    TODAY'S SUBJECTIVE & REVIEW OF SYMPTOMS:     Constitutional WNL   Cardio WNL   Resp WNL   GI WNL  Heme WNL  Endo WNL  Skin WNL  MSK WNL  Neuro WNL  Cognitive WNL  Psych WNL      MEDICATIONS  (STANDING):  aspirin enteric coated 81 milliGRAM(s) Oral daily  atorvastatin 80 milliGRAM(s) Oral at bedtime  brimonidine 0.2% Ophthalmic Solution 1 Drop(s) Both EYES two times a day  chlorhexidine 2% Cloths 1 Application(s) Topical <User Schedule>  enoxaparin Injectable 40 milliGRAM(s) SubCutaneous every 24 hours  latanoprost 0.005% Ophthalmic Solution 1 Drop(s) Both EYES at bedtime  pantoprazole    Tablet 40 milliGRAM(s) Oral before breakfast  sodium chloride 0.9%. 1000 milliLiter(s) (75 mL/Hr) IV Continuous <Continuous>    MEDICATIONS  (PRN):      FAMILY HISTORY:      Allergies    No Known Allergies    Intolerances        SOCIAL HISTORY:    [  ] Etoh  [  ] Smoking  [  ] Substance abuse     Home Environment:  [ x ] Home Alone  [  ] Lives with Family son  up stairs   [  ] Home Health Aid    Dwelling:  [  ] Apartment  [ x ] Private House  [  ] Adult Home  [  ] Skilled Nursing Facility      [  ] Short Term  [  ] Long Term  [  x] Stairs       Elevator [  ]    FUNCTIONAL STATUS PTA: (Check all that apply)  Ambulation: [ x  ]Independent    [  ] Dependent     [  ] Non-Ambulatory  Assistive Device: [  ] SA Cane  [  ]  Q Cane  [  ] Walker  [  ]  Wheelchair  ADL : [ x ] Independent  [  ]  Dependent       Vital Signs Last 24 Hrs  T(C): 36.8 (2025 08:10), Max: 36.8 (2025 23:38)  T(F): 98.2 (2025 08:10), Max: 98.2 (2025 23:38)  HR: 78 (2025 08:10) (78 - 97)  BP: 124/69 (2025 08:10) (124/69 - 159/79)  BP(mean): 97 (2025 05:45) (97 - 97)  RR: 20 (2025 08:10) (18 - 24)  SpO2: 93% (2025 08:10) (93% - 97%)    Parameters below as of 2025 05:45  Patient On (Oxygen Delivery Method): room air          PHYSICAL EXAM: Alert & Oriented X3  GENERAL: NAD, well-groomed, well-developed  HEAD:  Atraumatic, Normocephalic  EYES: EOMI, PERRLA, conjunctiva and sclera clear  NECK: Supple, No JVD, Normal thyroid  CHEST/LUNG: Clear to percussion bilaterally; No rales, rhonchi, wheezing, or rubs  HEART: Regular rate and rhythm; No murmurs, rubs, or gallops  ABDOMEN: Soft, Nontender, Nondistended; Bowel sounds present  EXTREMITIES:  2+ Peripheral Pulses, No clubbing, cyanosis, or edema    NERVOUS SYSTEM:  Cranial Nerves 2-12 intact [x  ] Abnormal  [  ]  ROM: WFL all extremities [  x]  Abnormal [  ]  Motor Strength: WFL all extremities  [x  ]  Abnormal [  ]  Sensation: intact to light touch [ x ] Abnormal [  ]  Reflexes: Symmetric [ x ]  Abnormal [  ]    FUNCTIONAL STATUS:  Bed Mobility: Independent [  ]  Supervision [  ]  Needs Assistance [  ]  N/A [x  ]  Transfers: Independent [  ]  Supervision [  ]  Needs Assistance [  ]  N/A [ x ]   Ambulation: Independent [  ]  Supervision [  ]  Needs Assistance [  ]  N/A [x  ]  ADL: Independent [  ] Requires Assistance [  ] N/A [  x]  SEE PT/OT IE NOTES    LABS:                        14.7   6.03  )-----------( 277      ( 2025 00:02 )             41.8     06-04    130[L]  |  91[L]  |  19  ----------------------------<  112[H]  3.8   |  24  |  0.5[L]    Ca    10.2      2025 00:02  Mg     1.9     06-04    TPro  6.6  /  Alb  4.4  /  TBili  0.7  /  DBili  x   /  AST  24  /  ALT  18  /  AlkPhos  56  06-04    PT/INR - ( 2025 00:02 )   PT: 11.90 sec;   INR: 1.01 ratio         PTT - ( 2025 00:02 )  PTT:27.6 sec  Urinalysis Basic - ( 2025 02:48 )    Color: Yellow / Appearance: Clear / S.025 / pH: x  Gluc: x / Ketone: x  / Bili: Negative / Urobili: 0.2 mg/dL   Blood: x / Protein: Negative mg/dL / Nitrite: Negative   Leuk Esterase: Small / RBC: 2 /HPF / WBC 5 /HPF   Sq Epi: x / Non Sq Epi: x / Bacteria: Few /HPF        RADIOLOGY & ADDITIONAL STUDIES:< from: CT Head No Cont (25 @ 00:30) >    IMPRESSION:  No evidence of acute intracranial pathology      < end of copied text >      Assesment:

## 2025-06-04 NOTE — CONSULT NOTE ADULT - ASSESSMENT
IMPRESSION: Rehab of 74 y/o  f rehab  for r/o  cva  tia      PRECAUTIONS: [  ] Cardiac  [  ] Respiratory  [  ] Seizures [  ] Contact Isolation  [  ] Droplet Isolation  [ FALL ] Other    Weight Bearing Status:     RECOMMENDATION:    Out of Bed to Chair     DVT/Decubiti Prophylaxis    REHAB PLAN:     [ x  ] Bedside P/T 3-5 times a week   [  x ]   Bedside O/T  2-3 times a week             [   ] No Rehab Therapy Indicated                   [   ]  Speech Therapy   Conditioning/ROM                                    ADL  Bed Mobility                                               Conditioning/ROM  Transfers                                                     Bed Mobility  Sitting /Standing Balance                         Transfers                                        Gait Training                                               Sitting/Standing Balance  Stair Training [   ]Applicable                    Home equipment Eval                                                                        Splinting  [   ] Only      GOALS:   ADL   [  x ]   Independent                    Transfers  [ x  ] Independent                          Ambulation  [  x ] Independent     [    ] With device                            [   ]  CG                                                         [   ]  CG                                                                  [   ] CG                            [    ] Min A                                                   [   ] Min A                                                              [   ] Min  A          DISCHARGE PLAN:   [   ]  Good candidate for Intensive Rehabilitation/Hospital based-4A [facility]                                             Will tolerate 3hrs Intensive Rehab Daily                                       [    ]  Short Term Rehab in Skilled Nursing Facility                                       [  xx  ]  Home with Outpatient or VN services d/w  ptn rehab  plan  ptn agree                                           [    ]  Possible Candidate for Intensive Hospital based Rehab

## 2025-06-04 NOTE — CONSULT NOTE ADULT - SUBJECTIVE AND OBJECTIVE BOX
NEUROLOGY CONSULT    HPI: 74yo RHF PMH HTN, glaucoma L eye (L eye blind) presented to ED stating "I feel like I'm having a stroke". Patient is poor historian. She states that she came to the ED because she felt she couldn't drive. She is unsure why but she remembers feeling as if she should not get in the car and drive. She believes she might have been a little confused. She might have had some speech difficulties, although she can not explain. She denies dizziness, such as "spin" sensation or unsteadiness. She denies issue with coordination. She states that for 2 days prior she had not been eating (she is unsure why) and also having diarrhea. She has never experienced a similar constellation of symptoms before. She takes care of her ADLs independently; son lives in apartment of her house. No cane/walker at baseline. She sees her PCP routinely and is compliant with Rx. She does not take blood thinners at home.        MEDICATIONS  Home Medications:  AmLODIPine: 5 milligram(s) once a day (2025 06:11)  brimonidine 0.2% ophthalmic solution: 1 drop(s) in the left eye 2 times a day (2025 06:15)  latanoprost 0.005% ophthalmic solution: 1 drop(s) in the left eye once a day (2025 06:12)    MEDICATIONS  (STANDING):  amLODIPine   Tablet 5 milliGRAM(s) Oral daily  aspirin enteric coated 81 milliGRAM(s) Oral daily  atorvastatin 80 milliGRAM(s) Oral at bedtime  brimonidine 0.2% Ophthalmic Solution 1 Drop(s) Both EYES two times a day  chlorhexidine 2% Cloths 1 Application(s) Topical <User Schedule>  enoxaparin Injectable 40 milliGRAM(s) SubCutaneous every 24 hours  latanoprost 0.005% Ophthalmic Solution 1 Drop(s) Both EYES at bedtime  pantoprazole    Tablet 40 milliGRAM(s) Oral before breakfast  sodium chloride 0.9%. 1000 milliLiter(s) (75 mL/Hr) IV Continuous <Continuous>      SOCIAL HISTORY: negative for tobacco, alcohol, or illicit drug use.    Allergies    No Known Allergies          GEN: NAD, pleasant, cooperative    NEURO:   MENTAL STATUS: AAOx3. Able to recall president. Able to spell "world" backwards, although with some initial difficulty. Able to follow multi step commands.   LANG/SPEECH: Fluent, intact naming, repetition & comprehension  CRANIAL NERVES:  II: Normal visual fields R eye. Significantly reduced visual acuity L eye (chronic)   III, IV, VI: EOM intact, no gaze preference or deviation  V: normal  VII: no facial asymmetry  VIII: normal hearing to speech  MOTOR: 5/5 in both upper and lower extremities  REFLEXES: 2+ overall. Downgoing toes   SENSORY: Normal to light touch. No neglect   COORD: Normal finger to nose and heel to shin, no tremor    NIHSS: 0    LABS:                        14.7   6.03  )-----------( 277      ( 2025 00:02 )             41.8     06-04    130[L]  |  91[L]  |  19  ----------------------------<  112[H]  3.8   |  24  |  0.5[L]    Ca    10.2      2025 00:02  Mg     1.9     06-04    TPro  6.6  /  Alb  4.4  /  TBili  0.7  /  DBili  x   /  AST  24  /  ALT  18  /  AlkPhos  56  06-04    Hemoglobin A1C:   Vitamin B12   PT/INR - ( 2025 00:02 )   PT: 11.90 sec;   INR: 1.01 ratio         PTT - ( 2025 00:02 )  PTT:27.6 sec  CAPILLARY BLOOD GLUCOSE      POCT Blood Glucose.: 114 mg/dL (2025 07:35)      Urinalysis Basic - ( 2025 02:48 )    Color: Yellow / Appearance: Clear / S.025 / pH: x  Gluc: x / Ketone: x  / Bili: Negative / Urobili: 0.2 mg/dL   Blood: x / Protein: Negative mg/dL / Nitrite: Negative   Leuk Esterase: Small / RBC: 2 /HPF / WBC 5 /HPF   Sq Epi: x / Non Sq Epi: x / Bacteria: Few /HPF            Microbiology:    Urinalysis with Rflx Culture (collected 2025 02:48)        RADIOLOGY  < from: CT Head No Cont (25 @ 00:30) >  IMPRESSION:  No evidence of acute intracranial pathology    --- End of Report ---    < end of copied text >    < from: CT Angio Neck w/ IV Cont (25 @ 00:30) >  IMPRESSION:  No large vessel occlusion, aneurysm, or vascular malformation.    --- End of Report ---    < end of copied text >             NEUROLOGY CONSULT    HPI: 76yo RHF PMH HTN, glaucoma L eye (L eye blind) presented to ED stating "I feel like I'm having a stroke". Patient is poor historian. She states that she came to the ED because she felt she couldn't drive. She is unsure why but she remembers feeling as if she should not get in the car and drive. She believes she might have been a little confused. She might have had some speech difficulties, although she can not explain. She denies dizziness, such as "spin" sensation or unsteadiness. She denies issue with coordination. She states that for 2 days prior she had not been eating (she is unsure why) and also having diarrhea. She has never experienced a similar constellation of symptoms before. She takes care of her ADLs independently; son lives in apartment of her house. No cane/walker at baseline. She sees her PCP routinely and is compliant with Rx. She does not take blood thinners at home. No HA. No eye pain.       MEDICATIONS  Home Medications:  AmLODIPine: 5 milligram(s) once a day (2025 06:11)  brimonidine 0.2% ophthalmic solution: 1 drop(s) in the left eye 2 times a day (2025 06:15)  latanoprost 0.005% ophthalmic solution: 1 drop(s) in the left eye once a day (2025 06:12)    MEDICATIONS  (STANDING):  amLODIPine   Tablet 5 milliGRAM(s) Oral daily  aspirin enteric coated 81 milliGRAM(s) Oral daily  atorvastatin 80 milliGRAM(s) Oral at bedtime  brimonidine 0.2% Ophthalmic Solution 1 Drop(s) Both EYES two times a day  chlorhexidine 2% Cloths 1 Application(s) Topical <User Schedule>  enoxaparin Injectable 40 milliGRAM(s) SubCutaneous every 24 hours  latanoprost 0.005% Ophthalmic Solution 1 Drop(s) Both EYES at bedtime  pantoprazole    Tablet 40 milliGRAM(s) Oral before breakfast  sodium chloride 0.9%. 1000 milliLiter(s) (75 mL/Hr) IV Continuous <Continuous>      SOCIAL HISTORY: negative for tobacco, alcohol, or illicit drug use.    Allergies    No Known Allergies          GEN: NAD, pleasant, cooperative    NEURO:   MENTAL STATUS: AAOx3. Able to recall president. Able to spell "world" backwards, although with some initial difficulty. Able to follow multi step commands.   LANG/SPEECH: Fluent, intact naming, repetition & comprehension  CRANIAL NERVES:  II:  Normal visual fields R eye. Minimal reaction to light L eye, Significantly reduced visual acuity L eye (chronic)   III, IV, VI: EOM intact, no gaze preference or deviation  V: normal  VII: no facial asymmetry  VIII: normal hearing to speech  MOTOR: 5/5 in both upper and lower extremities. Mild drift RUE  REFLEXES: 2+ overall. Downgoing toes   SENSORY: Normal to light touch. No neglect   COORD: Normal finger to nose and heel to shin, no tremor    NIHSS: 1 (RUE drift)    LABS:                        14.7   6.03  )-----------( 277      ( 2025 00:02 )             41.8     06-04    130[L]  |  91[L]  |  19  ----------------------------<  112[H]  3.8   |  24  |  0.5[L]    Ca    10.2      2025 00:02  Mg     1.9     06-04    TPro  6.6  /  Alb  4.4  /  TBili  0.7  /  DBili  x   /  AST  24  /  ALT  18  /  AlkPhos  56  06-04    Hemoglobin A1C:   Vitamin B12   PT/INR - ( 2025 00:02 )   PT: 11.90 sec;   INR: 1.01 ratio         PTT - ( 2025 00:02 )  PTT:27.6 sec  CAPILLARY BLOOD GLUCOSE      POCT Blood Glucose.: 114 mg/dL (2025 07:35)      Urinalysis Basic - ( 2025 02:48 )    Color: Yellow / Appearance: Clear / S.025 / pH: x  Gluc: x / Ketone: x  / Bili: Negative / Urobili: 0.2 mg/dL   Blood: x / Protein: Negative mg/dL / Nitrite: Negative   Leuk Esterase: Small / RBC: 2 /HPF / WBC 5 /HPF   Sq Epi: x / Non Sq Epi: x / Bacteria: Few /HPF            Microbiology:    Urinalysis with Rflx Culture (collected 2025 02:48)        RADIOLOGY  < from: CT Head No Cont (25 @ 00:30) >  IMPRESSION:  No evidence of acute intracranial pathology    --- End of Report ---    < end of copied text >    < from: CT Angio Neck w/ IV Cont (25 @ 00:30) >  IMPRESSION:  No large vessel occlusion, aneurysm, or vascular malformation.    --- End of Report ---    < end of copied text >

## 2025-06-04 NOTE — H&P ADULT - NSHPLABSRESULTS_GEN_ALL_CORE
14.7   6.03  )-----------( 277      ( 2025 00:02 )             41.8       06-04    130[L]  |  91[L]  |  19  ----------------------------<  112[H]  3.8   |  24  |  0.5[L]    Ca    10.2      2025 00:02  Mg     1.9     06-04    TPro  6.6  /  Alb  4.4  /  TBili  0.7  /  DBili  x   /  AST  24  /  ALT  18  /  AlkPhos  56  06-04          Urinalysis Basic - ( 2025 02:48 )    Color: Yellow / Appearance: Clear / S.025 / pH: x  Gluc: x / Ketone: x  / Bili: Negative / Urobili: 0.2 mg/dL   Blood: x / Protein: Negative mg/dL / Nitrite: Negative   Leuk Esterase: Small / RBC: 2 /HPF / WBC 5 /HPF   Sq Epi: x / Non Sq Epi: x / Bacteria: Few /HPF      PT/INR - ( 2025 00:02 )   PT: 11.90 sec;   INR: 1.01 ratio         PTT - ( 2025 00:02 )  PTT:27.6 sec    Lactate Trend      CAPILLARY BLOOD GLUCOSE      POCT Blood Glucose.: 129 mg/dL (2025 23:40)

## 2025-06-05 VITALS
RESPIRATION RATE: 16 BRPM | HEART RATE: 70 BPM | OXYGEN SATURATION: 97 % | DIASTOLIC BLOOD PRESSURE: 85 MMHG | SYSTOLIC BLOOD PRESSURE: 169 MMHG

## 2025-06-05 LAB
A1C WITH ESTIMATED AVERAGE GLUCOSE RESULT: 5.4 % — SIGNIFICANT CHANGE UP (ref 4–5.6)
ANION GAP SERPL CALC-SCNC: 11 MMOL/L — SIGNIFICANT CHANGE UP (ref 7–14)
BUN SERPL-MCNC: 15 MG/DL — SIGNIFICANT CHANGE UP (ref 10–20)
CALCIUM SERPL-MCNC: 8.7 MG/DL — SIGNIFICANT CHANGE UP (ref 8.4–10.5)
CHLORIDE SERPL-SCNC: 107 MMOL/L — SIGNIFICANT CHANGE UP (ref 98–110)
CHOLEST SERPL-MCNC: 207 MG/DL — HIGH
CO2 SERPL-SCNC: 22 MMOL/L — SIGNIFICANT CHANGE UP (ref 17–32)
CREAT SERPL-MCNC: <0.5 MG/DL — LOW (ref 0.7–1.5)
CULTURE RESULTS: SIGNIFICANT CHANGE UP
EGFR: 104 ML/MIN/1.73M2 — SIGNIFICANT CHANGE UP
EGFR: 104 ML/MIN/1.73M2 — SIGNIFICANT CHANGE UP
ESTIMATED AVERAGE GLUCOSE: 108 MG/DL — SIGNIFICANT CHANGE UP (ref 68–114)
GLUCOSE SERPL-MCNC: 95 MG/DL — SIGNIFICANT CHANGE UP (ref 70–99)
HCT VFR BLD CALC: 38.1 % — SIGNIFICANT CHANGE UP (ref 37–47)
HDLC SERPL-MCNC: 79 MG/DL — SIGNIFICANT CHANGE UP
HGB BLD-MCNC: 13.1 G/DL — SIGNIFICANT CHANGE UP (ref 12–16)
LDLC SERPL-MCNC: 114 MG/DL — HIGH
LIPID PNL WITH DIRECT LDL SERPL: 114 MG/DL — HIGH
MCHC RBC-ENTMCNC: 31.6 PG — HIGH (ref 27–31)
MCHC RBC-ENTMCNC: 34.4 G/DL — SIGNIFICANT CHANGE UP (ref 32–37)
MCV RBC AUTO: 91.8 FL — SIGNIFICANT CHANGE UP (ref 81–99)
NONHDLC SERPL-MCNC: 128 MG/DL — SIGNIFICANT CHANGE UP
NRBC BLD AUTO-RTO: 0 /100 WBCS — SIGNIFICANT CHANGE UP (ref 0–0)
OSMOLALITY UR: 406 MOS/KG — SIGNIFICANT CHANGE UP (ref 50–1400)
PLATELET # BLD AUTO: 203 K/UL — SIGNIFICANT CHANGE UP (ref 130–400)
PMV BLD: 9.7 FL — SIGNIFICANT CHANGE UP (ref 7.4–10.4)
POTASSIUM SERPL-MCNC: 4.3 MMOL/L — SIGNIFICANT CHANGE UP (ref 3.5–5)
POTASSIUM SERPL-SCNC: 4.3 MMOL/L — SIGNIFICANT CHANGE UP (ref 3.5–5)
RBC # BLD: 4.15 M/UL — LOW (ref 4.2–5.4)
RBC # FLD: 13 % — SIGNIFICANT CHANGE UP (ref 11.5–14.5)
SODIUM SERPL-SCNC: 140 MMOL/L — SIGNIFICANT CHANGE UP (ref 135–146)
SPECIMEN SOURCE: SIGNIFICANT CHANGE UP
T3 SERPL-MCNC: 71 NG/DL — LOW (ref 80–200)
T4 AB SER-ACNC: 7.5 UG/DL — SIGNIFICANT CHANGE UP (ref 4.6–12)
T4 FREE SERPL-MCNC: 1.3 NG/DL — SIGNIFICANT CHANGE UP (ref 0.9–1.8)
TRIGL SERPL-MCNC: 77 MG/DL — SIGNIFICANT CHANGE UP
TSH SERPL-MCNC: 0.96 UIU/ML — SIGNIFICANT CHANGE UP (ref 0.27–4.2)
WBC # BLD: 7.43 K/UL — SIGNIFICANT CHANGE UP (ref 4.8–10.8)
WBC # FLD AUTO: 7.43 K/UL — SIGNIFICANT CHANGE UP (ref 4.8–10.8)

## 2025-06-05 PROCEDURE — 99238 HOSP IP/OBS DSCHRG MGMT 30/<: CPT

## 2025-06-05 RX ORDER — ATORVASTATIN CALCIUM 80 MG/1
1 TABLET, FILM COATED ORAL
Qty: 30 | Refills: 0
Start: 2025-06-05 | End: 2025-07-04

## 2025-06-05 RX ORDER — IPRATROPIUM BROMIDE AND ALBUTEROL SULFATE .5; 2.5 MG/3ML; MG/3ML
3 SOLUTION RESPIRATORY (INHALATION) EVERY 6 HOURS
Refills: 0 | Status: DISCONTINUED | OUTPATIENT
Start: 2025-06-05 | End: 2025-06-05

## 2025-06-05 RX ORDER — ATORVASTATIN CALCIUM 80 MG/1
1 TABLET, FILM COATED ORAL
Qty: 0 | Refills: 0 | DISCHARGE
Start: 2025-06-05

## 2025-06-05 RX ORDER — ALPRAZOLAM 0.5 MG
0.25 TABLET, EXTENDED RELEASE 24 HR ORAL ONCE
Refills: 0 | Status: DISCONTINUED | OUTPATIENT
Start: 2025-06-05 | End: 2025-06-05

## 2025-06-05 RX ADMIN — ENOXAPARIN SODIUM 40 MILLIGRAM(S): 100 INJECTION SUBCUTANEOUS at 05:22

## 2025-06-05 RX ADMIN — Medication 40 MILLIGRAM(S): at 05:22

## 2025-06-05 RX ADMIN — Medication 1 APPLICATION(S): at 05:22

## 2025-06-05 RX ADMIN — Medication 0.25 MILLIGRAM(S): at 05:22

## 2025-06-05 RX ADMIN — BRIMONIDINE TARTRATE 1 DROP(S): 1.5 SOLUTION/ DROPS OPHTHALMIC at 05:27

## 2025-06-05 RX ADMIN — IPRATROPIUM BROMIDE AND ALBUTEROL SULFATE 3 MILLILITER(S): .5; 2.5 SOLUTION RESPIRATORY (INHALATION) at 05:33

## 2025-06-05 RX ADMIN — Medication 81 MILLIGRAM(S): at 11:24

## 2025-06-05 NOTE — DISCHARGE NOTE PROVIDER - NSDCCPCAREPLAN_GEN_ALL_CORE_FT
PRINCIPAL DISCHARGE DIAGNOSIS  Diagnosis: TIA (transient ischemic attack)  Assessment and Plan of Treatment: Transient Ischemic Attack  A transient ischemic attack (TIA) happens when blood supply to the brain is blocked temporarily. A TIA causes stroke-like symptoms that go away quickly without causing any permanent damage. Having a TIA can be considered a warning sign for a stroke and should not be ignored. A person who has a TIA is at higher risk for a stroke.  What are the causes?  This condition is caused by a temporary blockage in an artery in the head or neck. This means the brain does not get the blood supply it needs. A blockage can be caused by:   Fatty buildup in an artery in the head or neck (atherosclerosis).  A blood clot traveling from the heart.  An artery tear (dissection).  Inflammation of an artery (vasculitis).  Sometimes the cause is not known.  You had a CT scan and MRI of the brain done which showed no evidence of stroke.   Get help right away if:  You have chest pain.  You have fast or irregular heartbeats (palpitations).  You have any symptoms of a stroke.  You have other signs of a stroke, such as:  A sudden, severe headache with no known cause.  Nausea or vomiting.  Seizure.  These symptoms may be an emergency. Get help right away. Call 911.  Do not wait to see if the symptoms will go away.  Do not drive yourself to the hospital.

## 2025-06-05 NOTE — DISCHARGE NOTE PROVIDER - CARE PROVIDER_API CALL
Sy Cui  Internal Medicine  [address] [place] [address]  Phone: [phone]  Fax: [phone]  Follow Up Time: 1 week

## 2025-06-05 NOTE — DISCHARGE NOTE PROVIDER - NSDCMRMEDTOKEN_GEN_ALL_CORE_FT
AmLODIPine: 5 milligram(s) once a day  atorvastatin 80 mg oral tablet: 1 tab(s) orally once a day (at bedtime)  brimonidine 0.2% ophthalmic solution: 1 drop(s) in the left eye 2 times a day  latanoprost 0.005% ophthalmic solution: 1 drop(s) in the left eye once a day

## 2025-06-05 NOTE — DISCHARGE NOTE NURSING/CASE MANAGEMENT/SOCIAL WORK - PATIENT PORTAL LINK FT
You can access the FollowMyHealth Patient Portal offered by [facility] by registering at the following website: [email or web address]. By joining [facility] [employer] [facility]’s FollowMyHealth portal, you will also be able to view your health information using other applications (apps) compatible with our system.

## 2025-06-05 NOTE — DISCHARGE NOTE NURSING/CASE MANAGEMENT/SOCIAL WORK - FINANCIAL ASSISTANCE
[facility] provides services at a reduced cost to those who are determined to be eligible through [facility]’s financial assistance program. Information regarding [facility]’s financial assistance program can be found by going to [email or web address] or by calling [phone].

## 2025-06-05 NOTE — CHART NOTE - NSCHARTNOTEFT_GEN_A_CORE
MRI Brain reviewed. No need for ASA from neurological perspective. Recall neuro prn.    Discussed with attending Dr Fried

## 2025-06-05 NOTE — PROGRESS NOTE ADULT - SUBJECTIVE AND OBJECTIVE BOX
MICU RESIDENT PROGRESS NOTE    Patient is a 74y old  Female who presents with a chief complaint of r/o stroke (05 Jun 2025 07:54)        INTERVAL HPI/OVERNIGHT EVENTS:   Overnight, Pt  Afebrile, hemodynamically stable     GCS:  Sedatives:  Pressors:  Lines  CENTRAL LINE| Yes: [ ] | No: [ ]  INDWELLING SARABIA| Yes: [ ] | No: [ ]      ICU Vital Signs Last 24 Hrs  T(C): 35.9 (05 Jun 2025 07:00), Max: 36.7 (04 Jun 2025 22:42)  T(F): 96.7 (05 Jun 2025 07:00), Max: 98 (04 Jun 2025 22:42)  HR: 75 (05 Jun 2025 07:15) (62 - 96)  BP: 105/61 (05 Jun 2025 07:15) (105/61 - 158/87)  BP(mean): 79 (05 Jun 2025 07:15) (79 - 117)  ABP: --  ABP(mean): --  RR: 25 (05 Jun 2025 07:15) (17 - 31)  SpO2: 98% (05 Jun 2025 07:15) (93% - 98%)    O2 Parameters below as of 05 Jun 2025 07:15  Patient On (Oxygen Delivery Method): nasal cannula  O2 Flow (L/min): 2        I&O's Summary    04 Jun 2025 07:01  -  05 Jun 2025 07:00  --------------------------------------------------------  IN: 1900 mL / OUT: 1750 mL / NET: 150 mL          LABS:                        13.1   7.43  )-----------( 203      ( 05 Jun 2025 05:19 )             38.1     06-05    140  |  107  |  15  ----------------------------<  95  4.3   |  22  |  <0.5[L]    Ca    8.7      05 Jun 2025 05:19  Mg     1.9     06-04    TPro  6.6  /  Alb  4.4  /  TBili  0.7  /  DBili  x   /  AST  24  /  ALT  18  /  AlkPhos  56  06-04    PT/INR - ( 04 Jun 2025 00:02 )   PT: 11.90 sec;   INR: 1.01 ratio         PTT - ( 04 Jun 2025 00:02 )  PTT:27.6 sec  Urinalysis Basic - ( 05 Jun 2025 05:19 )    Color: x / Appearance: x / SG: x / pH: x  Gluc: 95 mg/dL / Ketone: x  / Bili: x / Urobili: x   Blood: x / Protein: x / Nitrite: x   Leuk Esterase: x / RBC: x / WBC x   Sq Epi: x / Non Sq Epi: x / Bacteria: x      CAPILLARY BLOOD GLUCOSE            RADIOLOGY & ADDITIONAL TESTS:    Consultant(s) Notes Reviewed:  [x ] YES  [ ] NO    MEDICATIONS  (STANDING):  aspirin enteric coated 81 milliGRAM(s) Oral daily  atorvastatin 80 milliGRAM(s) Oral at bedtime  brimonidine 0.2% Ophthalmic Solution 1 Drop(s) Both EYES two times a day  chlorhexidine 2% Cloths 1 Application(s) Topical <User Schedule>  enoxaparin Injectable 40 milliGRAM(s) SubCutaneous every 24 hours  latanoprost 0.005% Ophthalmic Solution 1 Drop(s) Both EYES at bedtime  sodium chloride 0.9%. 1000 milliLiter(s) (75 mL/Hr) IV Continuous <Continuous>    MEDICATIONS  (PRN):  acetaminophen     Tablet .. 650 milliGRAM(s) Oral every 6 hours PRN Mild Pain (1 - 3)  albuterol/ipratropium for Nebulization 3 milliLiter(s) Nebulizer every 6 hours PRN Shortness of Breath and/or Wheezing  melatonin 5 milliGRAM(s) Oral at bedtime PRN Insomnia      PHYSICAL EXAM:  GENERAL:   HEAD:  Atraumatic, Normocephalic  EYES: EOMI, PERRLA, conjunctiva and sclera clear  NECK: Supple, No JVD, Normal thyroid, no enlarged nodes  NERVOUS SYSTEM:  Alert & Awake.  CHEST/LUNG: B/L good air entry; No rales, rhonchi, or wheezing  HEART: S1S2 normal, no S3, Regular rate and rhythm; No murmurs  ABDOMEN: Soft, Nontender, Nondistended; Bowel sounds present  EXTREMITIES:  2+ Peripheral Pulses, No clubbing, cyanosis, or edema  LYMPH: No lymphadenopathy noted  SKIN: No rashes or lesions   MICU RESIDENT PROGRESS NOTE    Patient is a 74y old  Female who presents with a chief complaint of r/o stroke (05 Jun 2025 07:54)        INTERVAL HPI/OVERNIGHT EVENTS:   Overnight, Pt afebrile, hemodynamically stable.       ICU Vital Signs Last 24 Hrs  T(C): 35.9 (05 Jun 2025 07:00), Max: 36.7 (04 Jun 2025 22:42)  T(F): 96.7 (05 Jun 2025 07:00), Max: 98 (04 Jun 2025 22:42)  HR: 75 (05 Jun 2025 07:15) (62 - 96)  BP: 105/61 (05 Jun 2025 07:15) (105/61 - 158/87)  BP(mean): 79 (05 Jun 2025 07:15) (79 - 117)  ABP: --  ABP(mean): --  RR: 25 (05 Jun 2025 07:15) (17 - 31)  SpO2: 98% (05 Jun 2025 07:15) (93% - 98%)    O2 Parameters below as of 05 Jun 2025 07:15  Patient On (Oxygen Delivery Method): nasal cannula  O2 Flow (L/min): 2        I&O's Summary    04 Jun 2025 07:01  -  05 Jun 2025 07:00  --------------------------------------------------------  IN: 1900 mL / OUT: 1750 mL / NET: 150 mL          LABS:                        13.1   7.43  )-----------( 203      ( 05 Jun 2025 05:19 )             38.1     06-05    140  |  107  |  15  ----------------------------<  95  4.3   |  22  |  <0.5[L]    Ca    8.7      05 Jun 2025 05:19  Mg     1.9     06-04    TPro  6.6  /  Alb  4.4  /  TBili  0.7  /  DBili  x   /  AST  24  /  ALT  18  /  AlkPhos  56  06-04    PT/INR - ( 04 Jun 2025 00:02 )   PT: 11.90 sec;   INR: 1.01 ratio         PTT - ( 04 Jun 2025 00:02 )  PTT:27.6 sec  Urinalysis Basic - ( 05 Jun 2025 05:19 )    Color: x / Appearance: x / SG: x / pH: x  Gluc: 95 mg/dL / Ketone: x  / Bili: x / Urobili: x   Blood: x / Protein: x / Nitrite: x   Leuk Esterase: x / RBC: x / WBC x   Sq Epi: x / Non Sq Epi: x / Bacteria: x      CAPILLARY BLOOD GLUCOSE            RADIOLOGY & ADDITIONAL TESTS:    Consultant(s) Notes Reviewed:  [x ] YES  [ ] NO    MEDICATIONS  (STANDING):  aspirin enteric coated 81 milliGRAM(s) Oral daily  atorvastatin 80 milliGRAM(s) Oral at bedtime  brimonidine 0.2% Ophthalmic Solution 1 Drop(s) Both EYES two times a day  chlorhexidine 2% Cloths 1 Application(s) Topical <User Schedule>  enoxaparin Injectable 40 milliGRAM(s) SubCutaneous every 24 hours  latanoprost 0.005% Ophthalmic Solution 1 Drop(s) Both EYES at bedtime  sodium chloride 0.9%. 1000 milliLiter(s) (75 mL/Hr) IV Continuous <Continuous>    MEDICATIONS  (PRN):  acetaminophen     Tablet .. 650 milliGRAM(s) Oral every 6 hours PRN Mild Pain (1 - 3)  albuterol/ipratropium for Nebulization 3 milliLiter(s) Nebulizer every 6 hours PRN Shortness of Breath and/or Wheezing  melatonin 5 milliGRAM(s) Oral at bedtime PRN Insomnia      PHYSICAL EXAM:  GENERAL: Sitting in chair. NAD.  HEAD: NCAT  EYES: EOMI, PERRL  NECK: Supple, No JVD, Normal thyroid, no enlarged nodes  NERVOUS SYSTEM:  Alert & Awake.  CHEST/LUNG: B/L good air entry; No rales, rhonchi, or wheezing  HEART: S1S2 normal, no S3, Regular rate and rhythm; No murmurs  ABDOMEN: Soft, Nontender, Nondistended; Bowel sounds present  EXTREMITIES:  2+ Peripheral Pulses, No clubbing, cyanosis, or edema  LYMPH: No lymphadenopathy noted  SKIN: No rashes or lesions   MICU RESIDENT PROGRESS NOTE    Patient is a 74y old  Female who presents with a chief complaint of r/o stroke (05 Jun 2025 07:54)        INTERVAL HPI/OVERNIGHT EVENTS:   Overnight, Pt afebrile. Complained of SOB and anxiety, reported improvement after PO ativan and duoneb.       ICU Vital Signs Last 24 Hrs  T(C): 35.9 (05 Jun 2025 07:00), Max: 36.7 (04 Jun 2025 22:42)  T(F): 96.7 (05 Jun 2025 07:00), Max: 98 (04 Jun 2025 22:42)  HR: 75 (05 Jun 2025 07:15) (62 - 96)  BP: 105/61 (05 Jun 2025 07:15) (105/61 - 158/87)  BP(mean): 79 (05 Jun 2025 07:15) (79 - 117)  ABP: --  ABP(mean): --  RR: 25 (05 Jun 2025 07:15) (17 - 31)  SpO2: 98% (05 Jun 2025 07:15) (93% - 98%)    O2 Parameters below as of 05 Jun 2025 07:15  Patient On (Oxygen Delivery Method): nasal cannula  O2 Flow (L/min): 2        I&O's Summary    04 Jun 2025 07:01  -  05 Jun 2025 07:00  --------------------------------------------------------  IN: 1900 mL / OUT: 1750 mL / NET: 150 mL          LABS:                        13.1   7.43  )-----------( 203      ( 05 Jun 2025 05:19 )             38.1     06-05    140  |  107  |  15  ----------------------------<  95  4.3   |  22  |  <0.5[L]    Ca    8.7      05 Jun 2025 05:19  Mg     1.9     06-04    TPro  6.6  /  Alb  4.4  /  TBili  0.7  /  DBili  x   /  AST  24  /  ALT  18  /  AlkPhos  56  06-04    PT/INR - ( 04 Jun 2025 00:02 )   PT: 11.90 sec;   INR: 1.01 ratio         PTT - ( 04 Jun 2025 00:02 )  PTT:27.6 sec  Urinalysis Basic - ( 05 Jun 2025 05:19 )    Color: x / Appearance: x / SG: x / pH: x  Gluc: 95 mg/dL / Ketone: x  / Bili: x / Urobili: x   Blood: x / Protein: x / Nitrite: x   Leuk Esterase: x / RBC: x / WBC x   Sq Epi: x / Non Sq Epi: x / Bacteria: x      CAPILLARY BLOOD GLUCOSE            RADIOLOGY & ADDITIONAL TESTS:    Consultant(s) Notes Reviewed:  [x ] YES  [ ] NO    MEDICATIONS  (STANDING):  aspirin enteric coated 81 milliGRAM(s) Oral daily  atorvastatin 80 milliGRAM(s) Oral at bedtime  brimonidine 0.2% Ophthalmic Solution 1 Drop(s) Both EYES two times a day  chlorhexidine 2% Cloths 1 Application(s) Topical <User Schedule>  enoxaparin Injectable 40 milliGRAM(s) SubCutaneous every 24 hours  latanoprost 0.005% Ophthalmic Solution 1 Drop(s) Both EYES at bedtime  sodium chloride 0.9%. 1000 milliLiter(s) (75 mL/Hr) IV Continuous <Continuous>    MEDICATIONS  (PRN):  acetaminophen     Tablet .. 650 milliGRAM(s) Oral every 6 hours PRN Mild Pain (1 - 3)  albuterol/ipratropium for Nebulization 3 milliLiter(s) Nebulizer every 6 hours PRN Shortness of Breath and/or Wheezing  melatonin 5 milliGRAM(s) Oral at bedtime PRN Insomnia      PHYSICAL EXAM:  GENERAL: Sitting in chair. NAD.  HEAD: NCAT  EYES: EOMI, PERRL  NECK: Supple, No JVD  NERVOUS SYSTEM:  Alert & Awake.  CHEST/LUNG: B/L good air entry; No rales, rhonchi, or wheezing  HEART: Regular rate and rhythm; No murmurs  ABDOMEN: Soft, Nontender, Nondistended  EXTREMITIES:  2+ Peripheral Pulses, No clubbing, cyanosis, or edema  SKIN: Warm and dry

## 2025-06-05 NOTE — PROGRESS NOTE ADULT - ASSESSMENT
IMPRESSION:    - AMS   - Hyponatremia- resolved  - H/O HTN   - H/O HLD        PLAN:      CNS: avoid sedation, CT head noted, f/u MRI brain WNL, Dc q 4 neuro checks     HEENT: Oral care    PULMONARY:  HOB @ 45 degrees.  Aspiration precautions, wean oxygen, CXR noted     CARDIOVASCULAR: avoid volume overload, MAP adequate, troponin noted, f/u echo with mild pulm HTN     GI: GI prophylaxis.  Feeding.      RENAL:  Follow up lytes.    INFECTIOUS DISEASE: Follow up cultures, procalcitonin, RVP     HEMATOLOGICAL:  DVT prophylaxis. DIMER    ENDOCRINE:  Follow up FS.  Insulin protocol if needed.    MUSCULOSKELETAL: ambulate as tolerated    Can DGTF     
IMPRESSION:    - AMS   - Hyponatremia- resolved  - H/O HTN   - H/O HLD        PLAN:    CNS: avoid sedation, CT head noted, f/u MRI brain WNL, Dc q 4 neuro checks     HEENT: Oral care    PULMONARY:  HOB @ 45 degrees.  Aspiration precautions, wean oxygen, CXR noted     CARDIOVASCULAR: avoid volume overload, MAP adequate, troponin noted, f/u echo with mild pulm HTN     GI: GI prophylaxis.  Feeding.      RENAL:  Follow up lytes.    INFECTIOUS DISEASE: Follow up cultures, procalcitonin, RVP     HEMATOLOGICAL:  DVT prophylaxis. DIMER    ENDOCRINE:  Follow up FS.  Insulin protocol if needed.    MUSCULOSKELETAL: ambulate as tolerated    Discharge home

## 2025-06-05 NOTE — DISCHARGE NOTE PROVIDER - ATTENDING DISCHARGE PHYSICAL EXAMINATION:
PHYSICAL EXAM:  Vital Signs Last 24 Hrs  T(C): 35.9 (05 Jun 2025 07:00), Max: 36.7 (04 Jun 2025 22:42)  T(F): 96.7 (05 Jun 2025 07:00), Max: 98 (04 Jun 2025 22:42)  HR: 70 (05 Jun 2025 15:22) (62 - 88)  BP: 169/85 (05 Jun 2025 15:22) (105/61 - 169/85)  BP(mean): 79 (05 Jun 2025 07:15) (79 - 117)  RR: 16 (05 Jun 2025 15:22) (14 - 31)  SpO2: 97% (05 Jun 2025 15:22) (93% - 98%)    Parameters below as of 05 Jun 2025 15:22  Patient On (Oxygen Delivery Method): room air      GENERAL: NAD, well-groomed, well-developed  HEAD:  Atraumatic, Normocephalic  EYES: EOMI, PERRLA, conjunctiva and sclera clear  ENMT: No tonsillar erythema, exudates, or enlargement; Moist mucous membranes, Good dentition, No lesions  NECK: Supple, No JVD, Normal thyroid  NERVOUS SYSTEM:  Alert & Oriented X3, Good concentration; Motor Strength 5/5 B/L upper and lower extremities; DTRs 2+ intact and symmetric  CHEST/LUNG: Clear to percussion bilaterally; No rales, rhonchi, wheezing, or rubs  HEART: Regular rate and rhythm; No murmurs, rubs, or gallops  ABDOMEN: Soft, Nontender, Nondistended; Bowel sounds present  EXTREMITIES:  2+ Peripheral Pulses, No clubbing, cyanosis, or edema  LYMPH: No lymphadenopathy noted  SKIN: No rashes or lesions

## 2025-06-05 NOTE — PROGRESS NOTE ADULT - SUBJECTIVE AND OBJECTIVE BOX
Patient is a 74y old  Female who presents with a chief complaint of r/o stroke (04 Jun 2025 09:00)        Over Night Events:  No acute events, on NC         ROS:     All ROS are negative except HPI         PHYSICAL EXAM    ICU Vital Signs Last 24 Hrs  T(C): 35.9 (05 Jun 2025 07:00), Max: 36.8 (04 Jun 2025 08:10)  T(F): 96.7 (05 Jun 2025 07:00), Max: 98.2 (04 Jun 2025 08:10)  HR: 75 (05 Jun 2025 07:15) (62 - 96)  BP: 105/61 (05 Jun 2025 07:15) (105/61 - 158/87)  BP(mean): 79 (05 Jun 2025 07:15) (79 - 117)  ABP: --  ABP(mean): --  RR: 25 (05 Jun 2025 07:15) (17 - 31)  SpO2: 98% (05 Jun 2025 07:15) (93% - 98%)    O2 Parameters below as of 05 Jun 2025 07:15  Patient On (Oxygen Delivery Method): nasal cannula  O2 Flow (L/min): 2          CONSTITUTIONAL:  NAD    ENT:   Airway patent,   Mouth with normal mucosa.   No thrush    EYES:   Pupils equal,   Round and reactive to light.    CARDIAC:   Normal rate,   Regular rhythm.    No edema      RESPIRATORY:   No wheezing  Bilateral BS  Normal chest expansion  Not tachypneic,  No use of accessory muscles    GASTROINTESTINAL:  Abdomen soft,   Non-tender,   No guarding,   + BS    MUSCULOSKELETAL:   Range of motion is not limited,  No clubbing, cyanosis    NEUROLOGICAL:   Alert and oriented     SKIN:   Skin normal color for race,   Warm and dry and intact.   No evidence of rash.      06-04-25 @ 07:01  -  06-05-25 @ 07:00  --------------------------------------------------------  IN:    Oral Fluid: 100 mL    sodium chloride 0.9%: 1800 mL  Total IN: 1900 mL    OUT:    Voided (mL): 1750 mL  Total OUT: 1750 mL    Total NET: 150 mL          LABS:                            13.1   7.43  )-----------( 203      ( 05 Jun 2025 05:19 )             38.1                                               06-05    140  |  107  |  15  ----------------------------<  95  4.3   |  22  |  <0.5[L]    Ca    8.7      05 Jun 2025 05:19  Mg     1.9     06-04    TPro  6.6  /  Alb  4.4  /  TBili  0.7  /  DBili  x   /  AST  24  /  ALT  18  /  AlkPhos  56  06-04      PT/INR - ( 04 Jun 2025 00:02 )   PT: 11.90 sec;   INR: 1.01 ratio         PTT - ( 04 Jun 2025 00:02 )  PTT:27.6 sec                                       Urinalysis Basic - ( 05 Jun 2025 05:19 )    Color: x / Appearance: x / SG: x / pH: x  Gluc: 95 mg/dL / Ketone: x  / Bili: x / Urobili: x   Blood: x / Protein: x / Nitrite: x   Leuk Esterase: x / RBC: x / WBC x   Sq Epi: x / Non Sq Epi: x / Bacteria: x                                                  LIVER FUNCTIONS - ( 04 Jun 2025 00:02 )  Alb: 4.4 g/dL / Pro: 6.6 g/dL / ALK PHOS: 56 U/L / ALT: 18 U/L / AST: 24 U/L / GGT: x                                                  Urinalysis with Rflx Culture (collected 04 Jun 2025 02:48)                                                                                           MEDICATIONS  (STANDING):  aspirin enteric coated 81 milliGRAM(s) Oral daily  atorvastatin 80 milliGRAM(s) Oral at bedtime  brimonidine 0.2% Ophthalmic Solution 1 Drop(s) Both EYES two times a day  chlorhexidine 2% Cloths 1 Application(s) Topical <User Schedule>  enoxaparin Injectable 40 milliGRAM(s) SubCutaneous every 24 hours  latanoprost 0.005% Ophthalmic Solution 1 Drop(s) Both EYES at bedtime  pantoprazole    Tablet 40 milliGRAM(s) Oral before breakfast  sodium chloride 0.9%. 1000 milliLiter(s) (75 mL/Hr) IV Continuous <Continuous>    MEDICATIONS  (PRN):  acetaminophen     Tablet .. 650 milliGRAM(s) Oral every 6 hours PRN Mild Pain (1 - 3)  albuterol/ipratropium for Nebulization 3 milliLiter(s) Nebulizer every 6 hours PRN Shortness of Breath and/or Wheezing  melatonin 5 milliGRAM(s) Oral at bedtime PRN Insomnia      New X-rays reviewed:                                                                                  ECHO

## 2025-06-05 NOTE — DISCHARGE NOTE PROVIDER - HOSPITAL COURSE
76yo RHF PMH HTN, glaucoma L eye (L eye blind) presented to ED stating "I feel like I'm having a stroke". Patient is poor historian. She states that she came to the ED because she felt she couldn't drive. She is unsure why but she remembers feeling as if she should not get in the car and drive. She believes she might have been a little confused. She might have had some speech difficulties, although she can not explain. She denies dizziness, such as "spin" sensation or unsteadiness. She denies issue with coordination. She states that for 2 days prior she had not been eating (she is unsure why) and also having diarrhea. She has never experienced a similar constellation of symptoms before. She takes care of her ADLs independently; son lives in apartment of her house. No cane/walker at baseline. She sees her PCP routinely and is compliant with Rx. She does not take blood thinners at home. No HA. No eye pain. Stroke code was activated in the ED. CT imaging showed no evidence of acute intracranial pathology, large vessel occlusion, aneurysm, or vascular malformation. Labs were also significant for a sodium of 130. Patient was admitted to SDU for stroke vs encephalopathy work-up and management of hyponatremia. Patient underwent MR brain which showed no acute infarct, acute intracranial hemorrhage, or mass effect. Patient remained at neurologic baseline throughout hospital course and hyponatremia resolved.                     76yo RHF PMH HTN, glaucoma L eye (L eye blind) presented to ED stating "I feel like I'm having a stroke". Patient is poor historian. She states that she came to the ED because she felt she couldn't drive. She is unsure why but she remembers feeling as if she should not get in the car and drive. She believes she might have been a little confused. She might have had some speech difficulties, although she can not explain. She denies dizziness, such as "spin" sensation or unsteadiness. She denies issue with coordination. She states that for 2 days prior she had not been eating (she is unsure why) and also having diarrhea. She has never experienced a similar constellation of symptoms before. She takes care of her ADLs independently; son lives in apartment of her house. No cane/walker at baseline. She sees her PCP routinely and is compliant with Rx. She does not take blood thinners at home. No HA. No eye pain. Stroke code was activated in the ED. CT imaging showed no evidence of acute intracranial pathology, large vessel occlusion, aneurysm, or vascular malformation. Labs were also significant for a sodium of 130. Patient was admitted to SDU for stroke vs encephalopathy work-up and management of hyponatremia. Patient underwent MR brain which showed no acute infarct, acute intracranial hemorrhage, or mass effect. Patient remained at neurologic baseline throughout hospital course and hyponatremia resolved.    #TIA   - CT head non-con, CTA head and neck negative   - MRI brain negative   - No need for standing ASA as per neurology.    - Atorvastatin 80mg daily    #HTN   - c/w home amlodipine     #Hyponatremia (resolved)    #Glaucoma   - c/w with home latanoprost and brimonidine eye drops                 76yo RHF PMH HTN, glaucoma L eye (L eye blind) presented to ED stating "I feel like I'm having a stroke". Patient is poor historian. She states that she came to the ED because she felt she couldn't drive. She is unsure why but she remembers feeling as if she should not get in the car and drive. She believes she might have been a little confused. She might have had some speech difficulties, although she can not explain. She denies dizziness, such as "spin" sensation or unsteadiness. She denies issue with coordination. She states that for 2 days prior she had not been eating (she is unsure why) and also having diarrhea. She has never experienced a similar constellation of symptoms before. She takes care of her ADLs independently; son lives in apartment of her house. No cane/walker at baseline. She sees her PCP routinely and is compliant with Rx. She does not take blood thinners at home. No HA. No eye pain. Stroke code was activated in the ED. CT imaging showed no evidence of acute intracranial pathology, large vessel occlusion, aneurysm, or vascular malformation. Labs were also significant for a sodium of 130. Patient was admitted to SDU for stroke vs encephalopathy work-up and management of hyponatremia. Patient underwent MR brain which showed no acute infarct, acute intracranial hemorrhage, or mass effect. Patient remained at neurologic baseline throughout hospital course and hyponatremia resolved.    #transient unsteadiness / hyperlipidemia   - CT head non-con, CTA head and neck negative   - MRI brain negative   - No need for standing ASA as per neurology.    - Atorvastatin 80mg daily    #HTN   - c/w home amlodipine     #Hyponatremia (resolved)    #Glaucoma   - c/w with home latanoprost and brimonidine eye drops

## 2025-06-06 LAB
AMPHET UR-MCNC: NEGATIVE NG/ML — SIGNIFICANT CHANGE UP
BARBITURATES UR QL SCN: NEGATIVE NG/ML — SIGNIFICANT CHANGE UP
BARBITURATES UR-MCNC: NEGATIVE NG/ML — SIGNIFICANT CHANGE UP
BENZODIAZ UR-MCNC: NEGATIVE NG/ML — SIGNIFICANT CHANGE UP
COCAINE METAB.OTHER UR-MCNC: NEGATIVE NG/ML — SIGNIFICANT CHANGE UP
CREATININE, URINE THERAPEUTIC: 35.6 MG/DL — SIGNIFICANT CHANGE UP (ref 20–300)
FENTANYL UR QL SCN: NEGATIVE NG/ML — SIGNIFICANT CHANGE UP
METHADONE UR QL SCN: NEGATIVE NG/ML — SIGNIFICANT CHANGE UP
OPIATES UR-MCNC: NEGATIVE NG/ML — SIGNIFICANT CHANGE UP
OXYCODONE UR QL SCN: NEGATIVE NG/ML — SIGNIFICANT CHANGE UP
PCP UR-MCNC: NEGATIVE NG/ML — SIGNIFICANT CHANGE UP
PH, URINE RESULT: 6.1 — SIGNIFICANT CHANGE UP (ref 4.5–8.9)
THC UR QL: NEGATIVE NG/ML — SIGNIFICANT CHANGE UP

## 2025-06-17 DIAGNOSIS — G45.9 TRANSIENT CEREBRAL ISCHEMIC ATTACK, UNSPECIFIED: ICD-10-CM

## 2025-06-17 DIAGNOSIS — Z79.82 LONG TERM (CURRENT) USE OF ASPIRIN: ICD-10-CM

## 2025-06-17 DIAGNOSIS — H40.9 UNSPECIFIED GLAUCOMA: ICD-10-CM

## 2025-06-17 DIAGNOSIS — I10 ESSENTIAL (PRIMARY) HYPERTENSION: ICD-10-CM

## 2025-06-17 DIAGNOSIS — E87.1 HYPO-OSMOLALITY AND HYPONATREMIA: ICD-10-CM

## 2025-06-17 DIAGNOSIS — E78.5 HYPERLIPIDEMIA, UNSPECIFIED: ICD-10-CM
